# Patient Record
(demographics unavailable — no encounter records)

---
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
## 2025-03-06 NOTE — PHYSICAL EXAM
[Cooperative] : cooperative [Euthymic] : euthymic [Full] : full [Clear] : clear [Linear/Goal Directed] : linear/goal directed [Average] : average [WNL] : within normal limits [Normal] : normal [None] : none [Positive interaction] : positive interaction [Unremarkable/age appropriate] : unremarkable/age appropriate

## 2025-03-06 NOTE — DISCUSSION/SUMMARY
[Low acute suicide risk] : Low acute suicide risk [Unable to determine] : Unable to determine [Yes] : Safety Plan completed/updated (for individuals at risk): Yes [FreeTextEntry1] : Patient is a 11 year-old male, domiciled with parents and siblings, enrolled in [employer], in the 6th grade regular education, [employer], with prior psychiatric history of ASD/impulsivity, previously in outpatient treatment with therapist (age 6), no h/o psychiatric hospitalizations, h/o of self-injury or suicide attempts, no h/o aggression/violence/legal issues, no CPS involvement, no substance use, trauma hx, no significant pmhx, now presenting accompanied by mother, referred by family, for help connecting to resources.  In my medical opinion the pt is not an acute risk of harm to self or others and does not warrant psychiatric hospitalization. Plan as per above.  Plan: Linkage to individual therapy, family therapy, and parent management training with Dr. Parikh.

## 2025-03-06 NOTE — ADDENDUM
[FreeTextEntry1] : Patient was seen and examined by me, Dr. Anneliese Hamm. I reviewed and agreed with the findings and plan as documented in the [facility] [place] and psychologist's note, unless noted below.  pt is not at imminent risk for suicide or homicide, is able to engage in safety planning, is not grossly manic or psychotic, has parents who are able to follow through with treatment recommendations, and is therefore not meeting criteria for involuntary psychiatric hospitalization. They are appropriate for outpatient management.

## 2025-03-06 NOTE — DISCUSSION/SUMMARY
[Low acute suicide risk] : Low acute suicide risk [Unable to determine] : Unable to determine [Yes] : Safety Plan completed/updated (for individuals at risk): Yes [FreeTextEntry1] : Risk factors: male gender, ASD, h/o impulsivity,  recent suicidal thoughts/NSSIB, depressive symptoms, anxiety symptoms, psychosocial stressors, h/o trauma, not in treatment,   family history of psychiatric illness/substance use, family h/o suicide  Protective factors:  age, domiciled with supportive family, engaged in school, no prior SA , not current si/i/p, no h/o violence, no current hi/i/p, help-seeking, motivated for outpatient treatment, future oriented with short and long term goals, barriers to suicide, no access to guns,  not acutely manic or psychotic, no substance abuse

## 2025-03-06 NOTE — REASON FOR VISIT
[Behavioral Health Urgent Care Assessment] : a behavioral health urgent care assessment [School] : school [TextBox_17] : depression/self-injury/being the target of bullying

## 2025-03-06 NOTE — FAMILY HISTORY
[FreeTextEntry1] : father has h/o cutting and hitting himself Grandfather on mothers side attempted suicide 3 times. Fathers side uncles have issues with substance abuse (alcohol and drugs) paternal aunt with bipolar and other diagnosis paternal side has significant history of mental health issues Grandfather on mom side suffered from depression

## 2025-03-06 NOTE — PLAN
[Provision of National Suicide Prevention Lifeline 8-531-128-TALK (5231)] : Provision of national suicide prevention lifeline 9-417-000-talk (0991) [Patient] : patient [Family] : family [Education provided regarding environmental safety/ lethal means restriction] : Education provided regarding environmental safety/ lethal means restriction [Contact was Attempted] : contact was attempted [TextBox_11] : family to contact [facility] should they wish linkage to peds neuro for ADHD [TextBox_13] : Safety plan completed with patient using the Roderick-Brown Safety Plan", a copy was provided to the patient and encouraged to put it in an easily accessible place i.e. on a phone or bedside table.  The Safety Plan is a best practice recommendation by the Suicide Prevention Resource Center.  The family was advised to call 911 or take the patient to the nearest ER if patient's behavior worsens or if any safety concerns arise. Discussed with the family the importance of locking away all sharp objects in the home including sharp knives, razors and scissors. The family agrees to secure any firearms and ammunition in a location outside of the home. Recommended to patient and family to move all pills into a locked storage box. All involved verbalized understanding.    [TextBox_26] : school consent obtained
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
## 2025-03-06 NOTE — PLAN
[Provision of National Suicide Prevention Lifeline 6-086-737-TALK (5517)] : Provision of national suicide prevention lifeline 2-276-387-talk (0067) [Patient] : patient [Family] : family [Education provided regarding environmental safety/ lethal means restriction] : Education provided regarding environmental safety/ lethal means restriction [Contact was Attempted] : contact was attempted [TextBox_11] : family to contact [facility] should they wish linkage to peds neuro for ADHD [TextBox_13] : Safety plan completed with patient using the Roderick-Brown Safety Plan", a copy was provided to the patient and encouraged to put it in an easily accessible place i.e. on a phone or bedside table.  The Safety Plan is a best practice recommendation by the Suicide Prevention Resource Center.  The family was advised to call 911 or take the patient to the nearest ER if patient's behavior worsens or if any safety concerns arise. Discussed with the family the importance of locking away all sharp objects in the home including sharp knives, razors and scissors. The family agrees to secure any firearms and ammunition in a location outside of the home. Recommended to patient and family to move all pills into a locked storage box. All involved verbalized understanding.    [TextBox_26] : school consent obtained

## 2025-03-06 NOTE — ADDENDUM
[FreeTextEntry1] : Patient was seen and examined by me, Dr. Anneliese Hamm. I reviewed and agreed with the findings and plan as documented in the [facility] [place] [place] intern and psychologist's note, unless noted below.  pt is not at imminent risk for suicide or homicide, is able to engage in safety planning, is not grossly manic or psychotic, has parents who are able to follow through with treatment recommendations, and is therefore not meeting criteria for involuntary psychiatric hospitalization. They are appropriate for outpatient management.
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
